# Patient Record
Sex: FEMALE | Race: WHITE | Employment: FULL TIME | URBAN - METROPOLITAN AREA
[De-identification: names, ages, dates, MRNs, and addresses within clinical notes are randomized per-mention and may not be internally consistent; named-entity substitution may affect disease eponyms.]

---

## 2017-05-10 ENCOUNTER — OFFICE VISIT (OUTPATIENT)
Dept: URGENT CARE | Facility: RETAIL CLINIC | Age: 57
End: 2017-05-10
Payer: COMMERCIAL

## 2017-05-10 VITALS — DIASTOLIC BLOOD PRESSURE: 100 MMHG | HEART RATE: 69 BPM | TEMPERATURE: 98.4 F | SYSTOLIC BLOOD PRESSURE: 162 MMHG

## 2017-05-10 DIAGNOSIS — J02.9 ACUTE PHARYNGITIS, UNSPECIFIED ETIOLOGY: Primary | ICD-10-CM

## 2017-05-10 DIAGNOSIS — R03.0 ELEVATED BLOOD PRESSURE READING WITHOUT DIAGNOSIS OF HYPERTENSION: ICD-10-CM

## 2017-05-10 LAB — S PYO AG THROAT QL IA.RAPID: NEGATIVE

## 2017-05-10 PROCEDURE — 99213 OFFICE O/P EST LOW 20 MIN: CPT | Performed by: PHYSICIAN ASSISTANT

## 2017-05-10 PROCEDURE — 87880 STREP A ASSAY W/OPTIC: CPT | Mod: QW | Performed by: PHYSICIAN ASSISTANT

## 2017-05-10 PROCEDURE — 87081 CULTURE SCREEN ONLY: CPT | Performed by: PHYSICIAN ASSISTANT

## 2017-05-10 NOTE — NURSING NOTE
"Chief Complaint   Patient presents with     Pharyngitis     x 1 day, headaches, body feels stiff and sore since today, no fevers       Initial BP (!) 168/98 (BP Location: Left arm)  Pulse 69  Temp 98.4  F (36.9  C) (Temporal) Estimated body mass index is 26.35 kg/(m^2) as calculated from the following:    Height as of 2/24/14: 5' 2.21\" (1.58 m).    Weight as of 7/30/16: 145 lb (65.8 kg).  Medication Reconciliation: complete    "

## 2017-05-10 NOTE — PATIENT INSTRUCTIONS
Rapid strep test today is negative.   Your throat culture is pending. [facility] will call you if positive results to start antibiotics at that time.  No phone call if strep culture is negative  Symptomatic treat with fluids, rest, salt water gargles, lozenges, and over the counter pain reliever, such as tylenol or ibuprofen as needed.   Please follow up with primary care provider if not improving, worsening or new symptoms     Your blood pressure is elevated at today's visit.  You should follow up with your primary provider regarding possible hypertension if your recheck are greater than 140/90.  Check your blood pressure several times in the next week or so. You can do this at local pharmacies, grocery stores or with the float nurse at the clinics. Record your readings and take them with you to your appointment.  Goal BP <140/90  Do not take decongestants - they can raise your blood pressure.  If you have chest pain, unusual headaches, vision changes or any sign or symptoms of stroke seek prompt medical attention.

## 2017-05-10 NOTE — PROGRESS NOTES
Chief Complaint   Patient presents with     Pharyngitis     x 1 day, headaches, body feels stiff and sore since today, no fevers      SUBJECTIVE:  Jo Doran is a 56 year old female with a chief complaint of sore throat.  Onset of symptoms was 1 day(s) ago.    Course of illness: gradual onset and still present.  Severity mild  Current and Associated symptoms: sore throat, headache, body stiff and sore  Treatment measures tried include Fluids.  Predisposing factors include None.    Past Medical History:   Diagnosis Date     Hypothyroid      Current Outpatient Prescriptions   Medication Sig Dispense Refill     levothyroxine (LEVOXYL) 75 MCG tablet Take by mouth daily. Due for annual visit and labs. a 90 tablet 0     betamethasone dipropionate (DIPROSONE) 0.05 % cream Apply topically 2 times daily Reported on 5/10/2017       Acetaminophen (TYLENOL PO) Take 1,000 mg by mouth every 6 hours as needed for mild pain or fever Reported on 5/10/2017       cetirizine (ZYRTEC) 10 MG tablet Take 1 tablet (10 mg) by mouth every evening (Patient not taking: Reported on 5/10/2017) 90 tablet 3        Allergies   Allergen Reactions     No Known Drug Allergies         History   Smoking Status     Former Smoker     Years: 13.00   Smokeless Tobacco     Never Used       ROS:  Review of systems negative except as stated above.    OBJECTIVE:   BP (!) 168/98 (BP Location: Left arm)  Pulse 69  Temp 98.4  F (36.9  C) (Temporal)  GENERAL APPEARANCE: healthy, alert and no distress  EYES: conjunctiva clear  HENT: ear canals and TM's normal.  Nose normal.  Pharynx erythematous with no exudate noted.  NECK: supple, non-tender to palpation, no adenopathy noted  RESP: lungs clear to auscultation - no rales, rhonchi or wheezes  CV: regular rates and rhythm, normal S1 S2, no murmur noted  SKIN: no suspicious lesions or rashes    Rapid Strep test is negative; await throat culture results.    ASSESSMENT:     Acute pharyngitis, unspecified  etiology  Elevated blood pressure reading without diagnosis of hypertension    PLAN:   Rapid strep test today is negative.   Your throat culture is pending. [facility] will call you if positive results to start antibiotics at that time.  No phone call if strep culture is negative  Symptomatic treat with fluids, rest, salt water gargles, lozenges, and over the counter pain reliever, such as tylenol or ibuprofen as needed.   Please follow up with primary care provider if not improving, worsening or new symptoms   Rapid strep test today is negative.   Your throat culture is pending. [facility] will call you if positive results to start antibiotics at that time.  No phone call if strep culture is negative  Symptomatic treat with fluids, rest, salt water gargles, lozenges, and over the counter pain reliever, such as tylenol or ibuprofen as needed.   Please follow up with primary care provider if not improving, worsening or new symptoms     Your blood pressure is elevated at today's visit.  You should follow up with your primary provider regarding possible hypertension if your recheck are greater than 140/90.  Check your blood pressure several times in the next week or so. You can do this at local pharmacies, grocery stores or with the float nurse at the clinics. Record your readings and take them with you to your appointment.  Goal BP <140/90  Do not take decongestants - they can raise your blood pressure.  If you have chest pain, unusual headaches, vision changes or any sign or symptoms of stroke seek prompt medical attention.    Ilda Barraza PA-C  [facility]

## 2017-05-10 NOTE — MR AVS SNAPSHOT
After Visit Summary   5/10/2017    Jo Doran    MRN: [id number]           Patient Information     Date Of Birth          1960        Visit Information        Provider Department      5/10/2017 4:00 PM Ilda Barraza PA-C [facility]        Today's Diagnoses     Acute pharyngitis, unspecified etiology    -  1    Elevated blood pressure reading without diagnosis of hypertension          Care Instructions    Rapid strep test today is negative.   Your throat culture is pending. [facility] will call you if positive results to start antibiotics at that time.  No phone call if strep culture is negative  Symptomatic treat with fluids, rest, salt water gargles, lozenges, and over the counter pain reliever, such as tylenol or ibuprofen as needed.   Please follow up with primary care provider if not improving, worsening or new symptoms     Your blood pressure is elevated at today's visit.  You should follow up with your primary provider regarding possible hypertension if your recheck are greater than 140/90.  Check your blood pressure several times in the next week or so. You can do this at local pharmacies, grocery stores or with the float nurse at the clinics. Record your readings and take them with you to your appointment.  Goal BP <140/90  Do not take decongestants - they can raise your blood pressure.  If you have chest pain, unusual headaches, vision changes or any sign or symptoms of stroke seek prompt medical attention.           Follow-ups after your visit        Who to contact     You can reach your care team any time of the day by calling [phone].  Notification of test results:  If you have an abnormal lab result, we will notify you by phone as soon as possible.         Additional Information About Your Visit        MyChart Information     [employer] lets you send messages to your doctor, view your test results, renew your prescriptions, schedule  "appointments and more. To sign up, go to [email or web address] . Click on \"Log in\" on the left side of the screen, which will take you to the Welcome page. Then click on \"Sign up Now\" on the right side of the page.     You will be asked to enter the access code listed below, as well as some personal information. Please follow the directions to create your username and password.     Your access code is: [email or web address]  Expires: 2017  4:28 PM     Your access code will  in 90 days. If you need help or a new code, please call your [facility] clinic or [phone].        Care EveryWhere ID     This is your [facility] EveryWhere ID. This could be used by other organizations to access your [facility] medical records  [id number]        Your Vitals Were     Pulse Temperature                69 98.4  F (36.9  C) (Temporal)           Blood Pressure from Last 3 Encounters:   05/10/17 (!) 162/100   16 (!) 162/92   14 (!) 170/108    Weight from Last 3 Encounters:   16 145 lb (65.8 kg)   14 152 lb (68.9 kg)   05/04/10 149 lb (67.6 kg)              We Performed the Following     BETA STREP GROUP A R/O CULTURE     RAPID STREP SCREEN        Primary Care Provider    None Specified       No primary provider on file.        Thank you!     Thank you for choosing [facility]  for your care. Our goal is always to provide you with excellent care. Hearing back from our patients is one way we can continue to improve our services. Please take a few minutes to complete the written survey that you may receive in the mail after your visit with us. Thank you!             Your Updated Medication List - Protect others around you: Learn how to safely use, store and throw away your medicines at www.disposemymeds.org.          This list is accurate as of: 5/10/17  4:29 PM.  Always use your most recent med list.                   Brand Name Dispense Instructions for use    betamethasone dipropionate 0.05 % " cream    DIPROSONE     Apply topically 2 times daily Reported on 5/10/2017       cetirizine 10 MG tablet    zyrTEC    90 tablet    Take 1 tablet (10 mg) by mouth every evening       levothyroxine 75 MCG tablet    LEVOXYL    90 tablet    Take by mouth daily. Due for annual visit and labs. a       TYLENOL PO      Take 1,000 mg by mouth every 6 hours as needed for mild pain or fever Reported on 5/10/2017

## 2017-05-13 LAB — BETA STREP CONFIRM: NORMAL

## 2022-01-03 ENCOUNTER — OFFICE VISIT (OUTPATIENT)
Dept: URBAN - METROPOLITAN AREA CLINIC 76 | Facility: CLINIC | Age: 62
End: 2022-01-03
Payer: COMMERCIAL

## 2022-01-03 DIAGNOSIS — H43.813 VITREOUS DEGENERATION, BILATERAL: Primary | ICD-10-CM

## 2022-01-03 DIAGNOSIS — H40.013 OPEN ANGLE WITH BORDERLINE FINDINGS, LOW RISK, BILATERAL: ICD-10-CM

## 2022-01-03 DIAGNOSIS — H25.13 AGE-RELATED NUCLEAR CATARACT, BILATERAL: ICD-10-CM

## 2022-01-03 PROCEDURE — 92133 CPTRZD OPH DX IMG PST SGM ON: CPT | Performed by: OPTOMETRIST

## 2022-01-03 PROCEDURE — 92134 CPTRZ OPH DX IMG PST SGM RTA: CPT | Performed by: OPTOMETRIST

## 2022-01-03 PROCEDURE — 99203 OFFICE O/P NEW LOW 30 MIN: CPT | Performed by: OPTOMETRIST

## 2022-01-03 ASSESSMENT — INTRAOCULAR PRESSURE
OS: 14
OD: 14

## 2022-01-03 NOTE — IMPRESSION/PLAN
Impression: Age-related nuclear cataract, bilateral: H25.13.
-Trace OU. Plan: Observe. No treatment currently recommended as cataracts do not affect the patients day to day life. Patient to monitor for vision changes and if vision significantly worsens, advised to RTC for evaluation.

## 2022-01-03 NOTE — IMPRESSION/PLAN
Impression: Vitreous degeneration, bilateral: H43.813.
-Partial PVD OD.
-Viewed with BIO. No holes, tears or detachments
-order and performed OCT mac, reviewed today Plan: Posterior vitreous detachment accounts for the patient's complaints. Discussed signs and symptoms of PVD/floaters. Signs and symptoms of retinal detachment were discussed in detail. There is no evidence of retina pathology. No treatment is required at this time. Patient instructed to call immediately if any signs or symptoms of retinal detachments occur.

## 2022-01-03 NOTE — IMPRESSION/PLAN
Impression: Open angle with borderline findings, low risk, bilateral: H40.013.
-Based on CDs OU. -IOP normotensive OU
-Order baseline ON/RNFL OCT, reviewed today, OD normal RNFL and OS borderline RNFL inferior Plan: Discussed, RTC for baseline glaucoma work-up.

## 2022-02-18 ENCOUNTER — OFFICE VISIT (OUTPATIENT)
Dept: URBAN - METROPOLITAN AREA CLINIC 76 | Facility: CLINIC | Age: 62
End: 2022-02-18
Payer: COMMERCIAL

## 2022-02-18 PROCEDURE — 76514 ECHO EXAM OF EYE THICKNESS: CPT | Performed by: OPTOMETRIST

## 2022-02-18 PROCEDURE — 92083 EXTENDED VISUAL FIELD XM: CPT | Performed by: OPTOMETRIST

## 2022-02-18 PROCEDURE — 99213 OFFICE O/P EST LOW 20 MIN: CPT | Performed by: OPTOMETRIST

## 2022-02-18 ASSESSMENT — INTRAOCULAR PRESSURE
OD: 16
OS: 16

## 2022-02-18 NOTE — IMPRESSION/PLAN
Impression: Open angle with borderline findings, low risk, bilateral: H40.013. versus low tension glaucoma
-Based on CDs OU. -IOP normotensive OU
-Baseline ON/RNFL OCT, OD normal RNFL and OS borderline RNFL inferior
-Baseline VF ordered and reviewed today; inconclusive defects OU.
-Thinner than avg pachy OU
-No fm h/x OAG Plan: Discussed condition and tx options. Hold off on tx at this time. Repeat IOP with VF 24-2 to determine need for drops. The pathophysiology of glaucoma and the difference between being a glaucoma suspect and having glaucoma were discussed with patient. Discussed and answered patients questions today. The risk of blindness if glaucoma goes undetected and or untreated was discussed with the patient.

## 2022-02-18 NOTE — IMPRESSION/PLAN
Impression: Vitreous degeneration, bilateral: H43.813.
-Partial PVD OU. -Viewed with BIO. No holes, tears or detachments Plan: Discussed signs and symptoms of PVD/floaters. Signs and symptoms of retinal detachment were discussed in detail. There is no evidence of retina pathology. No treatment is required at this time. Patient instructed to call immediately if any signs or symptoms of retinal detachments occur.